# Patient Record
Sex: FEMALE | Race: WHITE | NOT HISPANIC OR LATINO | Employment: UNEMPLOYED | URBAN - METROPOLITAN AREA
[De-identification: names, ages, dates, MRNs, and addresses within clinical notes are randomized per-mention and may not be internally consistent; named-entity substitution may affect disease eponyms.]

---

## 2018-02-02 ENCOUNTER — HOSPITAL ENCOUNTER (EMERGENCY)
Facility: OTHER | Age: 53
Discharge: HOME | End: 2018-02-02
Attending: EMERGENCY MEDICINE
Payer: MEDICAID

## 2018-02-02 VITALS
DIASTOLIC BLOOD PRESSURE: 89 MMHG | BODY MASS INDEX: 20.89 KG/M2 | RESPIRATION RATE: 18 BRPM | HEART RATE: 81 BPM | WEIGHT: 130 LBS | SYSTOLIC BLOOD PRESSURE: 169 MMHG | OXYGEN SATURATION: 99 % | TEMPERATURE: 98 F | HEIGHT: 66 IN

## 2018-02-02 DIAGNOSIS — R10.11 RUQ ABDOMINAL PAIN: ICD-10-CM

## 2018-02-02 DIAGNOSIS — R93.2 ABNORMAL ULTRASOUND OF LIVER: ICD-10-CM

## 2018-02-02 DIAGNOSIS — R79.89 ELEVATED LFTS: Primary | ICD-10-CM

## 2018-02-02 DIAGNOSIS — R07.9 CHEST PAIN: ICD-10-CM

## 2018-02-02 LAB
ALBUMIN SERPL BCP-MCNC: 4.5 G/DL
ALP SERPL-CCNC: 107 U/L
ALT SERPL W/O P-5'-P-CCNC: 99 U/L
ANION GAP SERPL CALC-SCNC: 15 MMOL/L
AST SERPL-CCNC: 97 U/L
BASOPHILS # BLD AUTO: 0.05 K/UL
BASOPHILS NFR BLD: 0.8 %
BILIRUB SERPL-MCNC: 1 MG/DL
BILIRUB UR QL STRIP: NEGATIVE
BUN SERPL-MCNC: 8 MG/DL
CALCIUM SERPL-MCNC: 9.6 MG/DL
CHLORIDE SERPL-SCNC: 99 MMOL/L
CLARITY UR: CLEAR
CO2 SERPL-SCNC: 25 MMOL/L
COLOR UR: YELLOW
CREAT SERPL-MCNC: 0.7 MG/DL
DIFFERENTIAL METHOD: ABNORMAL
EOSINOPHIL # BLD AUTO: 0 K/UL
EOSINOPHIL NFR BLD: 0.2 %
ERYTHROCYTE [DISTWIDTH] IN BLOOD BY AUTOMATED COUNT: 11.9 %
EST. GFR  (AFRICAN AMERICAN): >60 ML/MIN/1.73 M^2
EST. GFR  (NON AFRICAN AMERICAN): >60 ML/MIN/1.73 M^2
GLUCOSE SERPL-MCNC: 82 MG/DL
GLUCOSE UR QL STRIP: NEGATIVE
HCT VFR BLD AUTO: 42 %
HGB BLD-MCNC: 14.3 G/DL
HGB UR QL STRIP: NEGATIVE
KETONES UR QL STRIP: ABNORMAL
LEUKOCYTE ESTERASE UR QL STRIP: NEGATIVE
LIPASE SERPL-CCNC: 27 U/L
LYMPHOCYTES # BLD AUTO: 1.3 K/UL
LYMPHOCYTES NFR BLD: 21.1 %
MCH RBC QN AUTO: 33.6 PG
MCHC RBC AUTO-ENTMCNC: 34 G/DL
MCV RBC AUTO: 99 FL
MONOCYTES # BLD AUTO: 0.5 K/UL
MONOCYTES NFR BLD: 7.3 %
NEUTROPHILS # BLD AUTO: 4.4 K/UL
NEUTROPHILS NFR BLD: 70.3 %
NITRITE UR QL STRIP: NEGATIVE
PH UR STRIP: 6 [PH] (ref 5–8)
PLATELET # BLD AUTO: 220 K/UL
PMV BLD AUTO: 9.8 FL
POTASSIUM SERPL-SCNC: 3.8 MMOL/L
PROT SERPL-MCNC: 7.8 G/DL
PROT UR QL STRIP: NEGATIVE
RBC # BLD AUTO: 4.26 M/UL
SODIUM SERPL-SCNC: 139 MMOL/L
SP GR UR STRIP: <=1.005 (ref 1–1.03)
URN SPEC COLLECT METH UR: ABNORMAL
UROBILINOGEN UR STRIP-ACNC: NEGATIVE EU/DL
WBC # BLD AUTO: 6.29 K/UL

## 2018-02-02 PROCEDURE — 80053 COMPREHEN METABOLIC PANEL: CPT

## 2018-02-02 PROCEDURE — 96372 THER/PROPH/DIAG INJ SC/IM: CPT

## 2018-02-02 PROCEDURE — 36415 COLL VENOUS BLD VENIPUNCTURE: CPT

## 2018-02-02 PROCEDURE — 93010 ELECTROCARDIOGRAM REPORT: CPT | Mod: ,,, | Performed by: INTERNAL MEDICINE

## 2018-02-02 PROCEDURE — 63600175 PHARM REV CODE 636 W HCPCS: Performed by: PHYSICIAN ASSISTANT

## 2018-02-02 PROCEDURE — 99284 EMERGENCY DEPT VISIT MOD MDM: CPT | Mod: 25

## 2018-02-02 PROCEDURE — 83690 ASSAY OF LIPASE: CPT

## 2018-02-02 PROCEDURE — 93005 ELECTROCARDIOGRAM TRACING: CPT

## 2018-02-02 PROCEDURE — 81003 URINALYSIS AUTO W/O SCOPE: CPT

## 2018-02-02 PROCEDURE — 80074 ACUTE HEPATITIS PANEL: CPT

## 2018-02-02 PROCEDURE — 85025 COMPLETE CBC W/AUTO DIFF WBC: CPT

## 2018-02-02 RX ORDER — CHOLECALCIFEROL (VITAMIN D3) 25 MCG
1000 TABLET ORAL DAILY
COMMUNITY

## 2018-02-02 RX ORDER — KETOROLAC TROMETHAMINE 30 MG/ML
30 INJECTION, SOLUTION INTRAMUSCULAR; INTRAVENOUS
Status: COMPLETED | OUTPATIENT
Start: 2018-02-02 | End: 2018-02-02

## 2018-02-02 RX ORDER — KETOROLAC TROMETHAMINE 30 MG/ML
15 INJECTION, SOLUTION INTRAMUSCULAR; INTRAVENOUS
Status: DISCONTINUED | OUTPATIENT
Start: 2018-02-02 | End: 2018-02-02

## 2018-02-02 RX ADMIN — KETOROLAC TROMETHAMINE 30 MG: 30 INJECTION, SOLUTION INTRAMUSCULAR at 04:02

## 2018-02-02 NOTE — ED TRIAGE NOTES
Pt states last Friday began having upper abdominal pain under the right breast, states today now feels like it's under her left breast.  States feels a little disoriented, and dizzy.  Also states her chest felt tight this morning.  Denies pain or burning with urination.  Denies N/V.  Denies any recent cough or cold symptoms.

## 2018-02-02 NOTE — ED PROVIDER NOTES
"Encounter Date: 2/2/2018       History     Chief Complaint   Patient presents with    Chest Pain     + rigth sided chest pain radiating to right upper back described as "stabbing pain" 10/10 on pain scale. Denies SOB     52-year-old female with anxiety who recently quit smoking 2 months ago presents emergency department with complaints of right upper quadrant abdominal/chest pain.  She states that the pain started last Friday.  She reports that it stopped for a few days and then returned and has been present ever since.  She denies nausea, vomiting, fever, chills, urinary symptoms, shortness of breath.  She complains of pain that is a 10 out of 10.  She reports decreased appetite.  She does state that it was worse after eating.  She does report that the pain radiates to her back.  No current treatment for her symptoms.      The history is provided by the patient and a relative.     Review of patient's allergies indicates:  No Known Allergies  Past Medical History:   Diagnosis Date    Anxiety      History reviewed. No pertinent surgical history.  History reviewed. No pertinent family history.  Social History   Substance Use Topics    Smoking status: Former Smoker     Quit date: 11/2/2017    Smokeless tobacco: Never Used    Alcohol use 1.2 oz/week     2 Glasses of wine per week      Comment: daily     Review of Systems   Constitutional: Negative for chills and fever.   HENT: Negative for sore throat.    Respiratory: Negative for cough, chest tightness and wheezing.    Cardiovascular: Positive for chest pain.   Gastrointestinal: Positive for abdominal pain. Negative for diarrhea, nausea and vomiting.   Genitourinary: Negative for difficulty urinating, dysuria, flank pain, frequency, hematuria, pelvic pain and urgency.   Musculoskeletal: Negative for back pain, neck pain and neck stiffness.   Skin: Negative for rash.   Neurological: Negative for weakness.   Hematological: Does not bruise/bleed easily. "       Physical Exam     Initial Vitals [02/02/18 1504]   BP Pulse Resp Temp SpO2   (!) 156/94 100 18 98 °F (36.7 °C) 100 %      MAP       114.67         Physical Exam    Nursing note and vitals reviewed.  Constitutional: She appears well-developed and well-nourished. She is not diaphoretic.  Non-toxic appearance. No distress.   HENT:   Head: Normocephalic and atraumatic.   Right Ear: External ear normal.   Left Ear: External ear normal.   Nose: Nose normal.   Mouth/Throat: Oropharynx is clear and moist.   Eyes: Conjunctivae, EOM and lids are normal. Pupils are equal, round, and reactive to light. No scleral icterus.   Neck: Normal range of motion and phonation normal. Neck supple.   Cardiovascular: Normal rate, regular rhythm and normal heart sounds. Exam reveals no gallop and no friction rub.    No murmur heard.  Pulmonary/Chest: Effort normal and breath sounds normal. No respiratory distress. She has no decreased breath sounds. She has no wheezes. She has no rhonchi. She has no rales.   Abdominal: Soft. Normal appearance and bowel sounds are normal. She exhibits no distension. There is tenderness in the right upper quadrant. There is no rebound, no guarding, no CVA tenderness and no tenderness at McBurney's point.   Musculoskeletal: Normal range of motion.   No obvious deformities, moving all extremities, normal gait   Neurological: She is alert and oriented to person, place, and time. She has normal strength. No sensory deficit.   Skin: Skin is warm, dry and intact. Capillary refill takes less than 2 seconds. No lesion and no rash noted. No erythema.   Psychiatric: She has a normal mood and affect. Her speech is normal and behavior is normal. Judgment normal. Cognition and memory are normal.         ED Course   Procedures  Labs Reviewed   CBC W/ AUTO DIFFERENTIAL - Abnormal; Notable for the following:        Result Value    MCV 99 (*)     MCH 33.6 (*)     All other components within normal limits   COMPREHENSIVE  METABOLIC PANEL - Abnormal; Notable for the following:     AST 97 (*)     ALT 99 (*)     All other components within normal limits   URINALYSIS - Abnormal; Notable for the following:     Specific Gravity, UA <=1.005 (*)     Ketones, UA 1+ (*)     All other components within normal limits   LIPASE   HEPATITIS PANEL, ACUTE     EKG Readings: (Independently Interpreted)   Previous EKG: Compared with most recent EKG Previous EKG Date: 9/12. Rhythm: Normal Sinus Rhythm. Heart Rate: 92. Ectopy: No Ectopy. Conduction: Normal. ST Segments: Normal ST Segments. T Waves: Normal. Q Waves: V1, V2 and AVL. Clinical Impression: Normal Sinus Rhythm   Slight variance from previous secondary to lead placement. No acute ischemic change     Imaging Results          US Abdomen Limited (Final result)  Result time 02/02/18 16:36:05    Final result by Nguyen Oh MD (02/02/18 16:36:05)                 Impression:        No gallstones or evidence of acute cholecystitis.    Hepatic diffuse parenchymal nonspecific increased attenuation, which can be seen with hepatic steatosis or hepatitis, among others. Correlate clinically.      Electronically signed by: NGUYEN OH MD, MD  Date:     02/02/18  Time:    16:36              Narrative:    Comparison: None.    Findings: Right upper quadrant ultrasound performed.  The liver measures 15.8 cm in length and demonstrates diffuse homogeneous parenchymal increased attenuation without focal process seen.  Common bile duct is within normal limits at 0.4 cm.  No gallstones, gallbladder wall thickening, pericholecystic fluid or focal tenderness over the gallbladder.  The visualized portions of the pancreas and IVC are within normal limits.  The right kidney is normal in length measuring 11.9cm. No right sided hydronephrosis or renal masses identified.   No ascites in the imaged region.                             X-Ray Chest PA And Lateral (Final result)  Result time 02/02/18 15:48:50    Final result  by Taylor Amanda MD (02/02/18 15:48:50)                 Impression:     As above      Electronically signed by: Taylor Amanda MD  Date:     02/02/18  Time:    15:48              Narrative:    2 views obtained.    The cardiac size and pulmonary vascularity are within normal limits.  There is no pleural effusion.  The osseous structures are intact.  Lungs are clear.                              X-Rays:   Independently Interpreted Readings:   Chest X-Ray: Normal heart size.  No infiltrates.  No acute abnormalities.     Medical Decision Making:   History:   I obtained history from: someone other than patient.       <> Summary of History: relative  Old Medical Records: I decided to obtain old medical records.  Initial Assessment:   52-year-old female with complaints consistent with right upper quadrant abdominal pain with associated elevated LFTs and enlarged liver.  Vital signs stable, afebrile, neurovascular intact.  She is alert, healthy and nontoxic appearing.  She is in no apparent distress.  No focal neurological deficits.  Patient initially complaining of right-sided chest pain however on exam it seems to be more consistent with right upper quadrant abdominal pain.  Will obtain workup to rule out cholecystitis versus cholelithiasis  Independently Interpreted Test(s):   I have ordered and independently interpreted X-rays - see prior notes.  Clinical Tests:   Lab Tests: Ordered and Reviewed  Radiological Study: Ordered and Reviewed  Medical Tests: Ordered and Reviewed  ED Management:  Urine, labs and right upper quadrant ultrasound obtained.  EKG was obtained from triage and independently interpreted by myself with no acute changes from previous.  No evidence of acute ischemia or STEMI.  Chest x-ray was also obtained and independently interpreted by myself no evidence of infiltrate, pleural effusion, mass or consolidation, lesion or pneumothorax. she has no elevation in white blood cell count and H&H is  "stable.  Lipase is not elevated.  Her AST and ALT are minimally elevated at 97 and 99.  Urine with positive ketones however no evidence of serious bacterial infection, UTI, pyelonephritis.  Ultrasound shows no evidence of gallstones or acute cholecystitis.  She does have hepatic diffuse parenchymal nonspecific increased attenuation which can be seen with hepatic steatosis versus hepatitis.  Will obtain acute hepatitis panel.  She was administered IM Toradol in the emergency department.  At this time I do not feel that further emergent workup is indicated and she is strongly urged to follow-up with primary care in the next 48 hours or return for any worsening signs or symptoms.  She is advised to avoid Tylenol and alcohol at this time.  Patient does state that she had similar symptoms in the past and was told that her liver enzymes are elevated.  She states that after further testing, she was told "every thing was fine."  She does report to drinking wine daily.  She is urged return for any worsening symptoms including but not limited to fever, worsening pain, nausea, vomiting, hematemesis or bloody stool.  This patient was discussed with the attending plan.   Other:   I have discussed this case with another health care provider.       <> Summary of the Discussion: Alex  This note was created using Dragon Medical dictation.  There may be typographical errors secondary to dictation.                     ED Course      Clinical Impression:     1. Elevated LFTs    2. Chest pain    3. RUQ abdominal pain    4. Abnormal ultrasound of liver          Disposition:   Disposition: Discharged  Condition: Stable                        Gena Griggs PA-C  02/02/18 1652       Gena Griggs PA-C  02/02/18 1700    "

## 2018-02-02 NOTE — DISCHARGE INSTRUCTIONS
Avoid Tylenol intake.  Follow-up with primary care for further evaluation.  Return for any worsening signs or symptoms.

## 2018-02-02 NOTE — ED NOTES
Patient Identifiers for Liliana Owen checked and correct  LOC: The patient is awake, alert and aware of environment with an appropriate affect, the patient is oriented x 3 and speaking appropriate.  APPEARANCE: Patient resting comfortably and in no acute distress. The patient is clean and well groomed. The patient's clothing is properly fastened.  SKIN: The skin is warm and dry. The patient has normal skin turgor and moist mucus membranes. No rashes or lesions upon observation. Skin Intact , no breakdown noted.  Musculoskeletal :  Normal range of motion noted. Moves all extremities well, No swelling or tenderness noted  RESPIRATORY: Airway is open and patent, respirations are spontaneous, patient has a normal effort and rate. Pt reports pain to left and right ribs  ABDOMEN: Soft and non tender to palpation, no distention observed. Bowels Sounds are WNL all quads.pt reports pain to upper abdomen  PULSES: 2+ radial  pulses, symmetrical in all extremities.  NEUROLOGIC: PERRL, Motor strength 5/5 all extremities.  The pt's facial expression is symmetrical, patient moving all extremities, normal sensation in all extremities when touched with a finger.The patient is awake, alert and cooperative with a calm affect, patient is aware of environment.      Will continue to monitor

## 2018-02-05 LAB
HAV IGM SERPL QL IA: NEGATIVE
HBV CORE IGM SERPL QL IA: NEGATIVE
HBV SURFACE AG SERPL QL IA: NEGATIVE
HCV AB SERPL QL IA: NEGATIVE

## 2021-05-04 ENCOUNTER — CLINICAL SUPPORT (OUTPATIENT)
Dept: REHABILITATION | Facility: OTHER | Age: 56
End: 2021-05-04
Payer: MEDICAID

## 2021-05-04 DIAGNOSIS — M25.672 DECREASED RANGE OF MOTION OF LEFT ANKLE: ICD-10-CM

## 2021-05-04 DIAGNOSIS — R26.89 ANTALGIC GAIT: ICD-10-CM

## 2021-05-04 PROCEDURE — 97162 PT EVAL MOD COMPLEX 30 MIN: CPT | Mod: PN

## 2021-05-04 PROCEDURE — 97110 THERAPEUTIC EXERCISES: CPT | Mod: PN

## 2021-05-11 ENCOUNTER — DOCUMENTATION ONLY (OUTPATIENT)
Dept: REHABILITATION | Facility: OTHER | Age: 56
End: 2021-05-11

## 2021-05-11 DIAGNOSIS — R26.89 ANTALGIC GAIT: ICD-10-CM

## 2021-05-11 DIAGNOSIS — M25.672 DECREASED RANGE OF MOTION OF LEFT ANKLE: Primary | ICD-10-CM

## 2021-06-04 ENCOUNTER — CLINICAL SUPPORT (OUTPATIENT)
Dept: REHABILITATION | Facility: OTHER | Age: 56
End: 2021-06-04
Payer: MEDICAID

## 2021-06-04 DIAGNOSIS — M25.672 DECREASED RANGE OF MOTION OF LEFT ANKLE: Primary | ICD-10-CM

## 2021-06-04 DIAGNOSIS — R26.89 ANTALGIC GAIT: ICD-10-CM

## 2021-06-04 PROCEDURE — 97110 THERAPEUTIC EXERCISES: CPT | Mod: PN

## 2021-06-10 ENCOUNTER — CLINICAL SUPPORT (OUTPATIENT)
Dept: REHABILITATION | Facility: OTHER | Age: 56
End: 2021-06-10
Payer: MEDICAID

## 2021-06-10 DIAGNOSIS — R26.89 ANTALGIC GAIT: ICD-10-CM

## 2021-06-10 DIAGNOSIS — M25.672 DECREASED RANGE OF MOTION OF LEFT ANKLE: Primary | ICD-10-CM

## 2021-06-10 PROCEDURE — 97110 THERAPEUTIC EXERCISES: CPT | Mod: PN

## 2021-06-22 ENCOUNTER — CLINICAL SUPPORT (OUTPATIENT)
Dept: REHABILITATION | Facility: OTHER | Age: 56
End: 2021-06-22
Payer: MEDICAID

## 2021-06-22 DIAGNOSIS — R26.89 ANTALGIC GAIT: ICD-10-CM

## 2021-06-22 DIAGNOSIS — M25.672 DECREASED RANGE OF MOTION OF LEFT ANKLE: Primary | ICD-10-CM

## 2021-06-22 PROCEDURE — 97110 THERAPEUTIC EXERCISES: CPT | Mod: PN
